# Patient Record
Sex: FEMALE | Race: WHITE | NOT HISPANIC OR LATINO | URBAN - METROPOLITAN AREA
[De-identification: names, ages, dates, MRNs, and addresses within clinical notes are randomized per-mention and may not be internally consistent; named-entity substitution may affect disease eponyms.]

---

## 2018-01-15 ENCOUNTER — EMERGENCY (EMERGENCY)
Facility: HOSPITAL | Age: 48
LOS: 1 days | Discharge: HOME | End: 2018-01-15
Attending: EMERGENCY MEDICINE
Payer: COMMERCIAL

## 2018-01-15 VITALS
HEART RATE: 85 BPM | DIASTOLIC BLOOD PRESSURE: 78 MMHG | OXYGEN SATURATION: 99 % | RESPIRATION RATE: 19 BRPM | TEMPERATURE: 98 F | SYSTOLIC BLOOD PRESSURE: 135 MMHG

## 2018-01-15 VITALS
RESPIRATION RATE: 18 BRPM | SYSTOLIC BLOOD PRESSURE: 149 MMHG | HEART RATE: 88 BPM | DIASTOLIC BLOOD PRESSURE: 88 MMHG | OXYGEN SATURATION: 100 % | TEMPERATURE: 98 F

## 2018-01-15 DIAGNOSIS — N93.8 OTHER SPECIFIED ABNORMAL UTERINE AND VAGINAL BLEEDING: ICD-10-CM

## 2018-01-15 LAB
ALBUMIN SERPL ELPH-MCNC: 4.2 G/DL — SIGNIFICANT CHANGE UP (ref 3.3–5)
ALP SERPL-CCNC: 49 U/L — SIGNIFICANT CHANGE UP (ref 40–120)
ALT FLD-CCNC: 21 U/L RC — SIGNIFICANT CHANGE UP (ref 10–45)
ANION GAP SERPL CALC-SCNC: 13 MMOL/L — SIGNIFICANT CHANGE UP (ref 5–17)
APTT BLD: 28.6 SEC — SIGNIFICANT CHANGE UP (ref 27.5–37.4)
AST SERPL-CCNC: 28 U/L — SIGNIFICANT CHANGE UP (ref 10–40)
BASOPHILS # BLD AUTO: 0 K/UL — SIGNIFICANT CHANGE UP (ref 0–0.2)
BASOPHILS NFR BLD AUTO: 0.3 % — SIGNIFICANT CHANGE UP (ref 0–2)
BILIRUB SERPL-MCNC: 0.2 MG/DL — SIGNIFICANT CHANGE UP (ref 0.2–1.2)
BLD GP AB SCN SERPL QL: NEGATIVE — SIGNIFICANT CHANGE UP
BUN SERPL-MCNC: 11 MG/DL — SIGNIFICANT CHANGE UP (ref 7–23)
CALCIUM SERPL-MCNC: 9.4 MG/DL — SIGNIFICANT CHANGE UP (ref 8.4–10.5)
CHLORIDE SERPL-SCNC: 101 MMOL/L — SIGNIFICANT CHANGE UP (ref 96–108)
CO2 SERPL-SCNC: 26 MMOL/L — SIGNIFICANT CHANGE UP (ref 22–31)
CREAT SERPL-MCNC: 0.76 MG/DL — SIGNIFICANT CHANGE UP (ref 0.5–1.3)
EOSINOPHIL # BLD AUTO: 0.1 K/UL — SIGNIFICANT CHANGE UP (ref 0–0.5)
EOSINOPHIL NFR BLD AUTO: 1.1 % — SIGNIFICANT CHANGE UP (ref 0–6)
GAS PNL BLDV: SIGNIFICANT CHANGE UP
GLUCOSE SERPL-MCNC: 86 MG/DL — SIGNIFICANT CHANGE UP (ref 70–99)
HCT VFR BLD CALC: 37.2 % — SIGNIFICANT CHANGE UP (ref 34.5–45)
HGB BLD-MCNC: 13.3 G/DL — SIGNIFICANT CHANGE UP (ref 11.5–15.5)
INR BLD: 1.04 RATIO — SIGNIFICANT CHANGE UP (ref 0.88–1.16)
LYMPHOCYTES # BLD AUTO: 1.1 K/UL — SIGNIFICANT CHANGE UP (ref 1–3.3)
LYMPHOCYTES # BLD AUTO: 23.6 % — SIGNIFICANT CHANGE UP (ref 13–44)
MCHC RBC-ENTMCNC: 32.7 PG — SIGNIFICANT CHANGE UP (ref 27–34)
MCHC RBC-ENTMCNC: 35.8 GM/DL — SIGNIFICANT CHANGE UP (ref 32–36)
MCV RBC AUTO: 91.3 FL — SIGNIFICANT CHANGE UP (ref 80–100)
MONOCYTES # BLD AUTO: 0.7 K/UL — SIGNIFICANT CHANGE UP (ref 0–0.9)
MONOCYTES NFR BLD AUTO: 15.3 % — HIGH (ref 2–14)
NEUTROPHILS # BLD AUTO: 2.9 K/UL — SIGNIFICANT CHANGE UP (ref 1.8–7.4)
NEUTROPHILS NFR BLD AUTO: 59.7 % — SIGNIFICANT CHANGE UP (ref 43–77)
PLATELET # BLD AUTO: 191 K/UL — SIGNIFICANT CHANGE UP (ref 150–400)
POTASSIUM SERPL-MCNC: 3.8 MMOL/L — SIGNIFICANT CHANGE UP (ref 3.5–5.3)
POTASSIUM SERPL-SCNC: 3.8 MMOL/L — SIGNIFICANT CHANGE UP (ref 3.5–5.3)
PROT SERPL-MCNC: 7.3 G/DL — SIGNIFICANT CHANGE UP (ref 6–8.3)
PROTHROM AB SERPL-ACNC: 11.2 SEC — SIGNIFICANT CHANGE UP (ref 9.8–12.7)
RBC # BLD: 4.08 M/UL — SIGNIFICANT CHANGE UP (ref 3.8–5.2)
RBC # FLD: 11.8 % — SIGNIFICANT CHANGE UP (ref 10.3–14.5)
RH IG SCN BLD-IMP: POSITIVE — SIGNIFICANT CHANGE UP
SODIUM SERPL-SCNC: 140 MMOL/L — SIGNIFICANT CHANGE UP (ref 135–145)
WBC # BLD: 4.8 K/UL — SIGNIFICANT CHANGE UP (ref 3.8–10.5)
WBC # FLD AUTO: 4.8 K/UL — SIGNIFICANT CHANGE UP (ref 3.8–10.5)

## 2018-01-15 PROCEDURE — 82803 BLOOD GASES ANY COMBINATION: CPT

## 2018-01-15 PROCEDURE — 86900 BLOOD TYPING SEROLOGIC ABO: CPT

## 2018-01-15 PROCEDURE — 85610 PROTHROMBIN TIME: CPT

## 2018-01-15 PROCEDURE — 85027 COMPLETE CBC AUTOMATED: CPT

## 2018-01-15 PROCEDURE — 84295 ASSAY OF SERUM SODIUM: CPT

## 2018-01-15 PROCEDURE — 85730 THROMBOPLASTIN TIME PARTIAL: CPT

## 2018-01-15 PROCEDURE — 86901 BLOOD TYPING SEROLOGIC RH(D): CPT

## 2018-01-15 PROCEDURE — 86850 RBC ANTIBODY SCREEN: CPT

## 2018-01-15 PROCEDURE — 85014 HEMATOCRIT: CPT

## 2018-01-15 PROCEDURE — 93005 ELECTROCARDIOGRAM TRACING: CPT

## 2018-01-15 PROCEDURE — 82947 ASSAY GLUCOSE BLOOD QUANT: CPT

## 2018-01-15 PROCEDURE — 99284 EMERGENCY DEPT VISIT MOD MDM: CPT | Mod: 25

## 2018-01-15 PROCEDURE — 82330 ASSAY OF CALCIUM: CPT

## 2018-01-15 PROCEDURE — 93010 ELECTROCARDIOGRAM REPORT: CPT

## 2018-01-15 PROCEDURE — 80053 COMPREHEN METABOLIC PANEL: CPT

## 2018-01-15 PROCEDURE — 83605 ASSAY OF LACTIC ACID: CPT

## 2018-01-15 PROCEDURE — 84132 ASSAY OF SERUM POTASSIUM: CPT

## 2018-01-15 PROCEDURE — 82435 ASSAY OF BLOOD CHLORIDE: CPT

## 2018-01-15 RX ORDER — SODIUM CHLORIDE 9 MG/ML
500 INJECTION INTRAMUSCULAR; INTRAVENOUS; SUBCUTANEOUS ONCE
Qty: 0 | Refills: 0 | Status: COMPLETED | OUTPATIENT
Start: 2018-01-15 | End: 2018-01-15

## 2018-01-15 RX ORDER — MEDROXYPROGESTERONE ACETATE 150 MG/ML
1 INJECTION, SUSPENSION, EXTENDED RELEASE INTRAMUSCULAR
Qty: 10 | Refills: 0 | OUTPATIENT
Start: 2018-01-15 | End: 2018-01-24

## 2018-01-15 RX ADMIN — SODIUM CHLORIDE 500 MILLILITER(S): 9 INJECTION INTRAMUSCULAR; INTRAVENOUS; SUBCUTANEOUS at 16:06

## 2018-01-15 NOTE — ED PROVIDER NOTE - MEDICAL DECISION MAKING DETAILS
Patient presents with weakness and dizziness in setting of heavy menstrual bleeding. Will check CBC. Gynecology consulted.

## 2018-01-15 NOTE — ED PROVIDER NOTE - PHYSICAL EXAMINATION
General: NAD, laying in bed feeling weak  HEENT: PERRL; normal oropharynx  Neck: no JVD  Respiratory: CTAB  CV: RRR, no murmurs  GI: abdomen soft, non-tender, non-distended  : pelvic tenderness to palpation L > R  Neurology: AAOx3, no focal deficits  Psych: normal mood/affect  Extremities: No edema, + peripheral pulses  Skin: warm and dry

## 2018-01-15 NOTE — ED PROVIDER NOTE - NS ED ROS FT
REVIEW OF SYSTEMS:    CONSTITUTIONAL: +weakness; No fevers or chills  EYES/ENT: No visual changes;  No vertigo or throat pain   NECK: No pain or stiffness  RESPIRATORY: No cough, wheezing, hemoptysis; No shortness of breath  CARDIOVASCULAR: No chest pain or palpitations  GASTROINTESTINAL: No abdominal pain. No nausea, vomiting, or hematemesis; No diarrhea or constipation. No melena or hematochezia.  GENITOURINARY: No dysuria, frequency or hematuria  GYNECOLOGY: +heavy bleeding; +pelvic pain  NEUROLOGICAL: No numbness or weakness  MUSCULOSKELETAL: No joint pain. Normal range of motion  SKIN: No itching, burning, rashes, or lesions   All other review of systems is negative unless indicated above.

## 2018-01-15 NOTE — CONSULT NOTE ADULT - PROBLEM SELECTOR RECOMMENDATION 9
- provera 10mg for 10 days  - patient given bleeding precautions, told to return to the ED if bleeding is heavier or patient has syncope/ worsening dizziness  - patient to f/u with Dr. Ferguson in the office outpatient    d/w Dr. Sergey Brown PGY-2

## 2018-01-15 NOTE — ED PROVIDER NOTE - ATTENDING CONTRIBUTION TO CARE
attending Eber: 47yF p/w heavy vaginal bleeding x 2 weeks. Over last several days with passage of clots, soaking through 1 pad per hour. Seen at urgent care today where she had a syncopal episode. Spoke with OBGYN Florentino Ferguson and sent into ER. Mild lower abdominal cramping. Denies bloody/black stools, h/o easy bleeding or bruising. On exam VSS, pink conjunctiva, MMM, abdomen soft/NT. Will obtain labs, ekg, IVF, OB consult and reassess.

## 2018-01-15 NOTE — ED ADULT NURSE NOTE - OBJECTIVE STATEMENT
47 year old female A&OX3 PMHX of Partial Hysterectomy presents with bright red vaginal bleeding x 2 weeks. Patient states that for the last 7 days vaginal bleeding has increased to 1 pad per hour. Patient endorses going to an urgent care facility and experiencing a syncopal episode. Patient's lung sounds are clear and equal bilaterally. Patient is actively bleeding at this time. Patient does appear pale on arrival. Patient also endorses dizziness, weakness, shortness of breath. Patient notes that the other night she experienced and episode of chest pain. Patient denies nausea, vomiting, fevers, chills, diarrhea, constipation.

## 2018-01-15 NOTE — ED PROVIDER NOTE - OBJECTIVE STATEMENT
47-year-old woman with history of abnormal uterine bleeding who presents to the ED with 2 weeks of heavy menstruation. Over the past week, she has needed to change her pads about once per hour. She had a syncopal episode at urgent care. Today, she passed a very large clot. She is perimenopausal and has had sporadic menstrual cycles over the past year. Not on any medications.  Gyn: Nettie Ferguson

## 2018-02-28 ENCOUNTER — OUTPATIENT (OUTPATIENT)
Dept: OUTPATIENT SERVICES | Facility: HOSPITAL | Age: 48
LOS: 1 days | End: 2018-02-28
Payer: COMMERCIAL

## 2018-02-28 VITALS
DIASTOLIC BLOOD PRESSURE: 83 MMHG | TEMPERATURE: 98 F | RESPIRATION RATE: 20 BRPM | SYSTOLIC BLOOD PRESSURE: 122 MMHG | HEIGHT: 62 IN | OXYGEN SATURATION: 98 % | WEIGHT: 134.04 LBS | HEART RATE: 72 BPM

## 2018-02-28 DIAGNOSIS — Z98.890 OTHER SPECIFIED POSTPROCEDURAL STATES: Chronic | ICD-10-CM

## 2018-02-28 DIAGNOSIS — Z90.89 ACQUIRED ABSENCE OF OTHER ORGANS: Chronic | ICD-10-CM

## 2018-02-28 DIAGNOSIS — Z01.818 ENCOUNTER FOR OTHER PREPROCEDURAL EXAMINATION: ICD-10-CM

## 2018-02-28 DIAGNOSIS — N84.0 POLYP OF CORPUS UTERI: ICD-10-CM

## 2018-02-28 LAB
HCT VFR BLD CALC: 36 % — SIGNIFICANT CHANGE UP (ref 34.5–45)
HGB BLD-MCNC: 12 G/DL — SIGNIFICANT CHANGE UP (ref 11.5–15.5)
MCHC RBC-ENTMCNC: 30.5 PG — SIGNIFICANT CHANGE UP (ref 27–34)
MCHC RBC-ENTMCNC: 33.3 GM/DL — SIGNIFICANT CHANGE UP (ref 32–36)
MCV RBC AUTO: 91.4 FL — SIGNIFICANT CHANGE UP (ref 80–100)
PLATELET # BLD AUTO: 248 K/UL — SIGNIFICANT CHANGE UP (ref 150–400)
RBC # BLD: 3.94 M/UL — SIGNIFICANT CHANGE UP (ref 3.8–5.2)
RBC # FLD: 12.9 % — SIGNIFICANT CHANGE UP (ref 10.3–14.5)
WBC # BLD: 5.46 K/UL — SIGNIFICANT CHANGE UP (ref 3.8–10.5)
WBC # FLD AUTO: 5.46 K/UL — SIGNIFICANT CHANGE UP (ref 3.8–10.5)

## 2018-02-28 PROCEDURE — G0463: CPT

## 2018-02-28 PROCEDURE — 85027 COMPLETE CBC AUTOMATED: CPT

## 2018-02-28 RX ORDER — LIDOCAINE HCL 20 MG/ML
0.2 VIAL (ML) INJECTION ONCE
Qty: 0 | Refills: 0 | Status: DISCONTINUED | OUTPATIENT
Start: 2018-03-07 | End: 2018-03-22

## 2018-02-28 RX ORDER — SODIUM CHLORIDE 9 MG/ML
3 INJECTION INTRAMUSCULAR; INTRAVENOUS; SUBCUTANEOUS EVERY 8 HOURS
Qty: 0 | Refills: 0 | Status: DISCONTINUED | OUTPATIENT
Start: 2018-03-07 | End: 2018-03-22

## 2018-02-28 RX ORDER — ACETAMINOPHEN 500 MG
1000 TABLET ORAL ONCE
Qty: 0 | Refills: 0 | Status: COMPLETED | OUTPATIENT
Start: 2018-03-07 | End: 2018-03-07

## 2018-02-28 NOTE — H&P PST ADULT - ATTENDING COMMENTS
47 p3 hx rso with hx menorraghia, sono showed uterus 9x5x5.9 with ? adenomyosis , ebx with polyp 1.14x.4  here for operative hysteroscopy with symphion and novasure ablation if possible. Risks explained including uterine rupture, bleeding  and infection.

## 2018-02-28 NOTE — H&P PST ADULT - NSANTHOSAYNRD_GEN_A_CORE
No. EVERETTE screening performed.  STOP BANG Legend: 0-2 = LOW Risk; 3-4 = INTERMEDIATE Risk; 5-8 = HIGH Risk

## 2018-02-28 NOTE — H&P PST ADULT - RS GEN PE MLT RESP DETAILS PC
respirations non-labored/good air movement/normal/airway patent/clear to auscultation bilaterally/breath sounds equal

## 2018-02-28 NOTE — H&P PST ADULT - HISTORY OF PRESENT ILLNESS
47 yr old female with uterine polyp, irregular periods , Menorrhagia , Dysmenorrhea for past 1 year 47 yr old female with uterine polyp, irregular periods , Menorrhagia , Dysmenorrhea for past 1 year- uterine polyp. Now coming in for D & C, Operative Hysteroscopy, NovaSure Ablation, Symphion on 3/7/18.

## 2018-03-07 ENCOUNTER — TRANSCRIPTION ENCOUNTER (OUTPATIENT)
Age: 48
End: 2018-03-07

## 2018-03-07 ENCOUNTER — RESULT REVIEW (OUTPATIENT)
Age: 48
End: 2018-03-07

## 2018-03-07 ENCOUNTER — OUTPATIENT (OUTPATIENT)
Dept: OUTPATIENT SERVICES | Facility: HOSPITAL | Age: 48
LOS: 1 days | End: 2018-03-07
Payer: COMMERCIAL

## 2018-03-07 VITALS
DIASTOLIC BLOOD PRESSURE: 65 MMHG | SYSTOLIC BLOOD PRESSURE: 111 MMHG | OXYGEN SATURATION: 98 % | RESPIRATION RATE: 15 BRPM | HEART RATE: 68 BPM

## 2018-03-07 VITALS
HEIGHT: 62 IN | TEMPERATURE: 98 F | DIASTOLIC BLOOD PRESSURE: 84 MMHG | RESPIRATION RATE: 16 BRPM | WEIGHT: 134.04 LBS | HEART RATE: 77 BPM | SYSTOLIC BLOOD PRESSURE: 129 MMHG | OXYGEN SATURATION: 100 %

## 2018-03-07 DIAGNOSIS — Z01.818 ENCOUNTER FOR OTHER PREPROCEDURAL EXAMINATION: ICD-10-CM

## 2018-03-07 DIAGNOSIS — N84.0 POLYP OF CORPUS UTERI: ICD-10-CM

## 2018-03-07 DIAGNOSIS — Z98.890 OTHER SPECIFIED POSTPROCEDURAL STATES: Chronic | ICD-10-CM

## 2018-03-07 DIAGNOSIS — Z90.89 ACQUIRED ABSENCE OF OTHER ORGANS: Chronic | ICD-10-CM

## 2018-03-07 PROCEDURE — 88305 TISSUE EXAM BY PATHOLOGIST: CPT | Mod: 26

## 2018-03-07 PROCEDURE — 58563 HYSTEROSCOPY ABLATION: CPT

## 2018-03-07 PROCEDURE — 88305 TISSUE EXAM BY PATHOLOGIST: CPT

## 2018-03-07 RX ORDER — FAMOTIDINE 10 MG/ML
1 INJECTION INTRAVENOUS
Qty: 0 | Refills: 0 | COMMUNITY

## 2018-03-07 RX ORDER — CELECOXIB 200 MG/1
200 CAPSULE ORAL ONCE
Qty: 0 | Refills: 0 | Status: COMPLETED | OUTPATIENT
Start: 2018-03-07 | End: 2018-03-07

## 2018-03-07 RX ORDER — OXYCODONE HYDROCHLORIDE 5 MG/1
10 TABLET ORAL ONCE
Qty: 0 | Refills: 0 | Status: DISCONTINUED | OUTPATIENT
Start: 2018-03-07 | End: 2018-03-07

## 2018-03-07 RX ORDER — SODIUM CHLORIDE 9 MG/ML
1000 INJECTION, SOLUTION INTRAVENOUS
Qty: 0 | Refills: 0 | Status: DISCONTINUED | OUTPATIENT
Start: 2018-03-07 | End: 2018-03-22

## 2018-03-07 RX ORDER — ONDANSETRON 8 MG/1
4 TABLET, FILM COATED ORAL ONCE
Qty: 0 | Refills: 0 | Status: DISCONTINUED | OUTPATIENT
Start: 2018-03-07 | End: 2018-03-22

## 2018-03-07 RX ORDER — CELECOXIB 200 MG/1
200 CAPSULE ORAL ONCE
Qty: 0 | Refills: 0 | Status: DISCONTINUED | OUTPATIENT
Start: 2018-03-07 | End: 2018-03-22

## 2018-03-07 RX ORDER — OXYCODONE HYDROCHLORIDE 5 MG/1
5 TABLET ORAL ONCE
Qty: 0 | Refills: 0 | Status: DISCONTINUED | OUTPATIENT
Start: 2018-03-07 | End: 2018-03-07

## 2018-03-07 RX ADMIN — Medication 1000 MILLIGRAM(S): at 09:03

## 2018-03-07 RX ADMIN — CELECOXIB 200 MILLIGRAM(S): 200 CAPSULE ORAL at 09:03

## 2018-03-07 NOTE — BRIEF OPERATIVE NOTE - PROCEDURE
<<-----Click on this checkbox to enter Procedure Dilation and curettage  03/07/2018    Active  GILMA  NovaSure endometrial ablation  03/07/2018    Active  GILMA  Hysteroscopy  03/07/2018    Active  GILMA

## 2018-03-09 LAB — SURGICAL PATHOLOGY STUDY: SIGNIFICANT CHANGE UP

## 2018-10-05 PROBLEM — N83.201 UNSPECIFIED OVARIAN CYST, RIGHT SIDE: Chronic | Status: ACTIVE | Noted: 2018-02-28

## 2018-10-05 PROBLEM — J30.2 OTHER SEASONAL ALLERGIC RHINITIS: Chronic | Status: ACTIVE | Noted: 2018-02-28

## 2018-10-26 ENCOUNTER — APPOINTMENT (OUTPATIENT)
Dept: OBGYN | Facility: CLINIC | Age: 48
End: 2018-10-26
Payer: COMMERCIAL

## 2018-10-26 PROCEDURE — 99243 OFF/OP CNSLTJ NEW/EST LOW 30: CPT

## 2019-03-07 ENCOUNTER — OUTPATIENT (OUTPATIENT)
Dept: OUTPATIENT SERVICES | Facility: HOSPITAL | Age: 49
LOS: 1 days | End: 2019-03-07
Payer: COMMERCIAL

## 2019-03-07 VITALS
DIASTOLIC BLOOD PRESSURE: 82 MMHG | RESPIRATION RATE: 16 BRPM | WEIGHT: 132.06 LBS | TEMPERATURE: 98 F | OXYGEN SATURATION: 97 % | SYSTOLIC BLOOD PRESSURE: 127 MMHG | HEIGHT: 62 IN | HEART RATE: 62 BPM

## 2019-03-07 DIAGNOSIS — Z98.890 OTHER SPECIFIED POSTPROCEDURAL STATES: Chronic | ICD-10-CM

## 2019-03-07 DIAGNOSIS — Z90.89 ACQUIRED ABSENCE OF OTHER ORGANS: Chronic | ICD-10-CM

## 2019-03-07 DIAGNOSIS — Z01.84 ENCOUNTER FOR ANTIBODY RESPONSE EXAMINATION: ICD-10-CM

## 2019-03-07 DIAGNOSIS — Z90.49 ACQUIRED ABSENCE OF OTHER SPECIFIED PARTS OF DIGESTIVE TRACT: Chronic | ICD-10-CM

## 2019-03-07 DIAGNOSIS — R10.2 PELVIC AND PERINEAL PAIN: ICD-10-CM

## 2019-03-07 DIAGNOSIS — Z29.9 ENCOUNTER FOR PROPHYLACTIC MEASURES, UNSPECIFIED: ICD-10-CM

## 2019-03-07 DIAGNOSIS — N80.0 ENDOMETRIOSIS OF UTERUS: ICD-10-CM

## 2019-03-07 LAB
BLD GP AB SCN SERPL QL: NEGATIVE — SIGNIFICANT CHANGE UP
RH IG SCN BLD-IMP: POSITIVE — SIGNIFICANT CHANGE UP

## 2019-03-07 PROCEDURE — G0463: CPT

## 2019-03-07 PROCEDURE — 86900 BLOOD TYPING SEROLOGIC ABO: CPT

## 2019-03-07 PROCEDURE — 87086 URINE CULTURE/COLONY COUNT: CPT

## 2019-03-07 PROCEDURE — 86850 RBC ANTIBODY SCREEN: CPT

## 2019-03-07 PROCEDURE — 86901 BLOOD TYPING SEROLOGIC RH(D): CPT

## 2019-03-07 RX ORDER — LIDOCAINE HCL 20 MG/ML
0.2 VIAL (ML) INJECTION ONCE
Qty: 0 | Refills: 0 | Status: DISCONTINUED | OUTPATIENT
Start: 2019-03-14 | End: 2019-03-16

## 2019-03-07 NOTE — H&P PST ADULT - PMH
Adenomyosis  of uterus  Cyst of right ovary  removed- benign  Seasonal allergies    Uterine polyp  history of

## 2019-03-07 NOTE — H&P PST ADULT - HISTORY OF PRESENT ILLNESS
48  yr old female with adenomyosis of uterus, abnormal vaginal bleeding, spotting, presents to PST for scheduled total abdominal hysterectomy, bilateral salpingectomy, possible cystoscopy, possible exploratory laparotomy, abdominoplasty on 3/14/19. Denies fever, chills, no acute complaints.

## 2019-03-07 NOTE — H&P PST ADULT - PSH
H/O unilateral oophorectomy  right side with fallopian tube removal, age 16  History of appendectomy    History of hysteroscopy  D & C, Operative Hysteroscopy, NovaSure Ablation, Symphion on 3/7/18.  S/P tonsillectomy

## 2019-03-07 NOTE — H&P PST ADULT - ATTENDING COMMENTS
GYN Attg:  Pt consented for KEELY/BS in conjunction with plastics for abdominoplasty. All questions answered. Pt verbalized full understanding.  CANDIDA Mcdaniel

## 2019-03-07 NOTE — H&P PST ADULT - ASSESSMENT
GREGORIOI VTE 2.0 SCORE [CLOT updated 2019]    AGE RELATED RISK FACTORS                                                       MOBILITY RELATED FACTORS  [ x] Age 41-60 years                                            (1 Point)                    [ ] Bed rest                                                        (1 Point)  [ ] Age: 61-74 years                                           (2 Points)                  [ ] Plaster cast                                                   (2 Points)  [ ] Age= 75 years                                              (3 Points)                    [ ] Bed bound for more than 72 hours                 (2 Points)    DISEASE RELATED RISK FACTORS                                               GENDER SPECIFIC FACTORS  [ ] Edema in the lower extremities                       (1 Point)              [ ] Pregnancy                                                     (1 Point)  [ ] Varicose veins                                               (1 Point)                     [ ] Post-partum < 6 weeks                                   (1 Point)             [ ] BMI > 25 Kg/m2                                            (1 Point)                     [ ] Hormonal therapy  or oral contraception          (1 Point)                 [ ] Sepsis (in the previous month)                        (1 Point)               [ ] History of pregnancy complications                 (1 point)  [ ] Pneumonia or serious lung disease                                               [ ] Unexplained or recurrent                     (1 Point)           (in the previous month)                               (1 Point)  [ ] Abnormal pulmonary function test                     (1 Point)                 SURGERY RELATED RISK FACTORS  [ ] Acute myocardial infarction                              (1 Point)               [ ]  Section                                             (1 Point)  [ ] Congestive heart failure (in the previous month)  (1 Point)      [ ] Minor surgery                                                  (1 Point)   [ ] Inflammatory bowel disease                             (1 Point)               [ ] Arthroscopic surgery                                        (2 Points)  [ ] Central venous access                                      (2 Points)                [ x] General surgery lasting more than 45 minutes (2 points)  [ ] Malignancy- Present or previous                   (2 Points)                [ ] Elective arthroplasty                                         (5 points)    [ ] Stroke (in the previous month)                          (5 Points)                                                                                                                                                           HEMATOLOGY RELATED FACTORS                                                 TRAUMA RELATED RISK FACTORS  [ ] Prior episodes of VTE                                     (3 Points)                [ ] Fracture of the hip, pelvis, or leg                       (5 Points)  [ ] Positive family history for VTE                         (3 Points)             [ ] Acute spinal cord injury (in the previous month)  (5 Points)  [ ] Prothrombin 65559 A                                     (3 Points)               [ ] Paralysis  (less than 1 month)                             (5 Points)  [ ] Factor V Leiden                                             (3 Points)                  [ ] Multiple Trauma within 1 month                        (5 Points)  [ ] Lupus anticoagulants                                     (3 Points)                                                           [ ] Anticardiolipin antibodies                               (3 Points)                                                       [ ] High homocysteine in the blood                      (3 Points)                                             [ ] Other congenital or acquired thrombophilia      (3 Points)                                                [ ] Heparin induced thrombocytopenia                  (3 Points)                                     Total Score [   3       ]

## 2019-03-07 NOTE — H&P PST ADULT - PROBLEM SELECTOR PLAN 1
total abdominal hysterectomy, bilateral salpingectomy, possible cystoscopy, possible exploratory laparotomy, abdominoplasty on 3/14/19.  PST instructions provided, surgical scrub given, patient  verbalized understanding.   CBC, BMP, HgA1C done by PCP on 3/4/19, will be faxed to [facility]. Urine cx and T&S collected and sent.

## 2019-03-08 PROBLEM — N84.0 POLYP OF CORPUS UTERI: Chronic | Status: ACTIVE | Noted: 2018-02-28

## 2019-03-08 LAB
CULTURE RESULTS: SIGNIFICANT CHANGE UP
SPECIMEN SOURCE: SIGNIFICANT CHANGE UP

## 2019-03-13 ENCOUNTER — TRANSCRIPTION ENCOUNTER (OUTPATIENT)
Age: 49
End: 2019-03-13

## 2019-03-13 VITALS
DIASTOLIC BLOOD PRESSURE: 82 MMHG | OXYGEN SATURATION: 97 % | SYSTOLIC BLOOD PRESSURE: 127 MMHG | HEART RATE: 62 BPM | RESPIRATION RATE: 16 BRPM | TEMPERATURE: 80 F | WEIGHT: 132.06 LBS | HEIGHT: 62.01 IN

## 2019-03-14 ENCOUNTER — INPATIENT (INPATIENT)
Facility: HOSPITAL | Age: 49
LOS: 1 days | Discharge: HOME | DRG: 743 | End: 2019-03-16
Attending: OBSTETRICS & GYNECOLOGY | Admitting: OBSTETRICS & GYNECOLOGY
Payer: COMMERCIAL

## 2019-03-14 ENCOUNTER — RESULT REVIEW (OUTPATIENT)
Age: 49
End: 2019-03-14

## 2019-03-14 ENCOUNTER — APPOINTMENT (OUTPATIENT)
Dept: OBGYN | Facility: HOSPITAL | Age: 49
End: 2019-03-14

## 2019-03-14 DIAGNOSIS — Z90.49 ACQUIRED ABSENCE OF OTHER SPECIFIED PARTS OF DIGESTIVE TRACT: Chronic | ICD-10-CM

## 2019-03-14 DIAGNOSIS — Z90.89 ACQUIRED ABSENCE OF OTHER ORGANS: Chronic | ICD-10-CM

## 2019-03-14 DIAGNOSIS — Z98.890 OTHER SPECIFIED POSTPROCEDURAL STATES: Chronic | ICD-10-CM

## 2019-03-14 DIAGNOSIS — R10.2 PELVIC AND PERINEAL PAIN: ICD-10-CM

## 2019-03-14 LAB
ANION GAP SERPL CALC-SCNC: 16 MMOL/L — SIGNIFICANT CHANGE UP (ref 5–17)
BASOPHILS # BLD AUTO: 0 K/UL — SIGNIFICANT CHANGE UP (ref 0–0.2)
BASOPHILS NFR BLD AUTO: 0.1 % — SIGNIFICANT CHANGE UP (ref 0–2)
BUN SERPL-MCNC: 12 MG/DL — SIGNIFICANT CHANGE UP (ref 7–23)
CALCIUM SERPL-MCNC: 7.9 MG/DL — LOW (ref 8.4–10.5)
CHLORIDE SERPL-SCNC: 104 MMOL/L — SIGNIFICANT CHANGE UP (ref 96–108)
CO2 SERPL-SCNC: 21 MMOL/L — LOW (ref 22–31)
CREAT SERPL-MCNC: 0.81 MG/DL — SIGNIFICANT CHANGE UP (ref 0.5–1.3)
EOSINOPHIL # BLD AUTO: 0 K/UL — SIGNIFICANT CHANGE UP (ref 0–0.5)
EOSINOPHIL NFR BLD AUTO: 0.2 % — SIGNIFICANT CHANGE UP (ref 0–6)
GLUCOSE BLDC GLUCOMTR-MCNC: 89 MG/DL — SIGNIFICANT CHANGE UP (ref 70–99)
GLUCOSE SERPL-MCNC: 218 MG/DL — HIGH (ref 70–99)
HCG UR QL: NEGATIVE — SIGNIFICANT CHANGE UP
HCT VFR BLD CALC: 37.5 % — SIGNIFICANT CHANGE UP (ref 34.5–45)
HGB BLD-MCNC: 13 G/DL — SIGNIFICANT CHANGE UP (ref 11.5–15.5)
LYMPHOCYTES # BLD AUTO: 0.7 K/UL — LOW (ref 1–3.3)
LYMPHOCYTES # BLD AUTO: 4.9 % — LOW (ref 13–44)
MCHC RBC-ENTMCNC: 31.3 PG — SIGNIFICANT CHANGE UP (ref 27–34)
MCHC RBC-ENTMCNC: 34.6 GM/DL — SIGNIFICANT CHANGE UP (ref 32–36)
MCV RBC AUTO: 90.6 FL — SIGNIFICANT CHANGE UP (ref 80–100)
MONOCYTES # BLD AUTO: 0.8 K/UL — SIGNIFICANT CHANGE UP (ref 0–0.9)
MONOCYTES NFR BLD AUTO: 5.5 % — SIGNIFICANT CHANGE UP (ref 2–14)
NEUTROPHILS # BLD AUTO: 13.3 K/UL — HIGH (ref 1.8–7.4)
NEUTROPHILS NFR BLD AUTO: 89.3 % — HIGH (ref 43–77)
PLATELET # BLD AUTO: 198 K/UL — SIGNIFICANT CHANGE UP (ref 150–400)
POTASSIUM SERPL-MCNC: 4.2 MMOL/L — SIGNIFICANT CHANGE UP (ref 3.5–5.3)
POTASSIUM SERPL-SCNC: 4.2 MMOL/L — SIGNIFICANT CHANGE UP (ref 3.5–5.3)
RBC # BLD: 4.14 M/UL — SIGNIFICANT CHANGE UP (ref 3.8–5.2)
RBC # FLD: 12.1 % — SIGNIFICANT CHANGE UP (ref 10.3–14.5)
SODIUM SERPL-SCNC: 141 MMOL/L — SIGNIFICANT CHANGE UP (ref 135–145)
WBC # BLD: 14.9 K/UL — HIGH (ref 3.8–10.5)
WBC # FLD AUTO: 14.9 K/UL — HIGH (ref 3.8–10.5)

## 2019-03-14 PROCEDURE — 88302 TISSUE EXAM BY PATHOLOGIST: CPT | Mod: 26

## 2019-03-14 PROCEDURE — 88307 TISSUE EXAM BY PATHOLOGIST: CPT | Mod: 26

## 2019-03-14 PROCEDURE — 74018 RADEX ABDOMEN 1 VIEW: CPT | Mod: 26

## 2019-03-14 PROCEDURE — 15830 EXC EXCESSIVE SKIN ABDOMEN: CPT

## 2019-03-14 PROCEDURE — 58150 TOTAL HYSTERECTOMY: CPT | Mod: 80

## 2019-03-14 PROCEDURE — 58150 TOTAL HYSTERECTOMY: CPT

## 2019-03-14 PROCEDURE — 44604 SUTURE LARGE INTESTINE: CPT

## 2019-03-14 RX ORDER — DOCUSATE SODIUM 100 MG
100 CAPSULE ORAL THREE TIMES A DAY
Qty: 0 | Refills: 0 | Status: DISCONTINUED | OUTPATIENT
Start: 2019-03-14 | End: 2019-03-16

## 2019-03-14 RX ORDER — SENNA PLUS 8.6 MG/1
2 TABLET ORAL AT BEDTIME
Qty: 0 | Refills: 0 | Status: DISCONTINUED | OUTPATIENT
Start: 2019-03-14 | End: 2019-03-16

## 2019-03-14 RX ORDER — ENOXAPARIN SODIUM 100 MG/ML
40 INJECTION SUBCUTANEOUS EVERY 24 HOURS
Qty: 0 | Refills: 0 | Status: DISCONTINUED | OUTPATIENT
Start: 2019-03-14 | End: 2019-03-16

## 2019-03-14 RX ORDER — CEFOTETAN DISODIUM 1 G
2 VIAL (EA) INJECTION ONCE
Qty: 0 | Refills: 0 | Status: DISCONTINUED | OUTPATIENT
Start: 2019-03-14 | End: 2019-03-14

## 2019-03-14 RX ORDER — HYDROMORPHONE HYDROCHLORIDE 2 MG/ML
0.5 INJECTION INTRAMUSCULAR; INTRAVENOUS; SUBCUTANEOUS
Qty: 0 | Refills: 0 | Status: DISCONTINUED | OUTPATIENT
Start: 2019-03-14 | End: 2019-03-15

## 2019-03-14 RX ORDER — HYDROMORPHONE HYDROCHLORIDE 2 MG/ML
30 INJECTION INTRAMUSCULAR; INTRAVENOUS; SUBCUTANEOUS
Qty: 0 | Refills: 0 | Status: DISCONTINUED | OUTPATIENT
Start: 2019-03-14 | End: 2019-03-15

## 2019-03-14 RX ORDER — SODIUM CHLORIDE 9 MG/ML
1000 INJECTION, SOLUTION INTRAVENOUS
Qty: 0 | Refills: 0 | Status: DISCONTINUED | OUTPATIENT
Start: 2019-03-14 | End: 2019-03-16

## 2019-03-14 RX ORDER — ACETAMINOPHEN 500 MG
975 TABLET ORAL ONCE
Qty: 0 | Refills: 0 | Status: COMPLETED | OUTPATIENT
Start: 2019-03-14 | End: 2019-03-14

## 2019-03-14 RX ORDER — NALOXONE HYDROCHLORIDE 4 MG/.1ML
0.1 SPRAY NASAL
Qty: 0 | Refills: 0 | Status: DISCONTINUED | OUTPATIENT
Start: 2019-03-14 | End: 2019-03-15

## 2019-03-14 RX ORDER — ACETAMINOPHEN 500 MG
1000 TABLET ORAL ONCE
Qty: 0 | Refills: 0 | Status: COMPLETED | OUTPATIENT
Start: 2019-03-14 | End: 2019-03-15

## 2019-03-14 RX ORDER — ONDANSETRON 8 MG/1
4 TABLET, FILM COATED ORAL ONCE
Qty: 0 | Refills: 0 | Status: COMPLETED | OUTPATIENT
Start: 2019-03-14 | End: 2019-03-14

## 2019-03-14 RX ORDER — CEFAZOLIN SODIUM 1 G
2000 VIAL (EA) INJECTION EVERY 8 HOURS
Qty: 0 | Refills: 0 | Status: DISCONTINUED | OUTPATIENT
Start: 2019-03-14 | End: 2019-03-15

## 2019-03-14 RX ORDER — SODIUM CHLORIDE 9 MG/ML
3 INJECTION INTRAMUSCULAR; INTRAVENOUS; SUBCUTANEOUS EVERY 8 HOURS
Qty: 0 | Refills: 0 | Status: DISCONTINUED | OUTPATIENT
Start: 2019-03-14 | End: 2019-03-14

## 2019-03-14 RX ORDER — ONDANSETRON 8 MG/1
4 TABLET, FILM COATED ORAL EVERY 6 HOURS
Qty: 0 | Refills: 0 | Status: DISCONTINUED | OUTPATIENT
Start: 2019-03-14 | End: 2019-03-15

## 2019-03-14 RX ORDER — DIAZEPAM 5 MG
5 TABLET ORAL EVERY 6 HOURS
Qty: 0 | Refills: 0 | Status: DISCONTINUED | OUTPATIENT
Start: 2019-03-14 | End: 2019-03-16

## 2019-03-14 RX ORDER — FAMOTIDINE 10 MG/ML
20 INJECTION INTRAVENOUS ONCE
Qty: 0 | Refills: 0 | Status: COMPLETED | OUTPATIENT
Start: 2019-03-14 | End: 2019-03-14

## 2019-03-14 RX ORDER — CELECOXIB 200 MG/1
200 CAPSULE ORAL ONCE
Qty: 0 | Refills: 0 | Status: COMPLETED | OUTPATIENT
Start: 2019-03-14 | End: 2019-03-14

## 2019-03-14 RX ADMIN — HYDROMORPHONE HYDROCHLORIDE 30 MILLILITER(S): 2 INJECTION INTRAMUSCULAR; INTRAVENOUS; SUBCUTANEOUS at 20:08

## 2019-03-14 RX ADMIN — ONDANSETRON 4 MILLIGRAM(S): 8 TABLET, FILM COATED ORAL at 20:32

## 2019-03-14 RX ADMIN — FAMOTIDINE 20 MILLIGRAM(S): 10 INJECTION INTRAVENOUS at 11:59

## 2019-03-14 RX ADMIN — SODIUM CHLORIDE 125 MILLILITER(S): 9 INJECTION, SOLUTION INTRAVENOUS at 19:53

## 2019-03-14 RX ADMIN — CELECOXIB 200 MILLIGRAM(S): 200 CAPSULE ORAL at 11:59

## 2019-03-14 RX ADMIN — Medication 100 MILLIGRAM(S): at 22:16

## 2019-03-14 RX ADMIN — ONDANSETRON 4 MILLIGRAM(S): 8 TABLET, FILM COATED ORAL at 23:30

## 2019-03-14 NOTE — BRIEF OPERATIVE NOTE - OPERATION/FINDINGS
approx 10 cm uterus, L adenxa wnl, R ovary surgically absent, cecum adherant to Right cornua taken down sharply, Dr Maxx Bui of general surgery called in to examine cecum oversewed area of dissection, bilateral ureters seen below level of dissection, evicel x 2 placed over cuff and Left pelvic side wall

## 2019-03-14 NOTE — PATIENT PROFILE ADULT - NSPROPTRIGHTNOTIFY_GEN_A_NUR
Patient presents with: Foot Pain  Physical      HPI:  54yr old female with hx of anemia presents for routine adult physical, c/o bilateral heel pain and requesting OCPs for her upcoming travel.    Bilateral heel pain that has been worsening over the past f Family History   Problem Relation Age of Onset   • Heart Surgery Father    • High Blood Pressure Father    • Heart Disorder Father    • Diabetes Father    • Other (Other[other]) Mother         Jaundice   • Diabetes Mother    • Diabetes Brother      Soc Normal mood and affect. Health Maintenance:    1. Colon cancer screening: n/a  2. Last mammogram: UTD per pt   3. Last Pap smear: UTD per pt  4. Dexa Scan: n/a  5.  Immunizations:   Immunization History  Administered            Date(s) Administered    I dosing and SEs  - heel pain 2/2 plantar fasciitis and keesha's deformity, discussed proper footwear, heel inserts, ice, nsaids prn, stretches/exercises and avoid going barefoot  - rx for naproxen with food prn pain provided, pt has tolerated it in the pas declines

## 2019-03-14 NOTE — CHART NOTE - NSCHARTNOTEFT_GEN_A_CORE
R2 OBGYN POST-OP CHECK    STEVEN [id number]      S: Patient seen and evaluated at bedside.  She is awake, alert and resting comfortably in bed.   Patient reports pain controlled with analgesia. Pt reports nausea at times, has received IV zofran. denies, SOB, CP, palpitations, fever/chills.   has not attempted po intake.  Not OOB yet.    O:   T(C): 36.4 (03-14-19 @ 19:35), Max: 36.4 (03-14-19 @ 19:35)  HR: 91 (03-14-19 @ 21:30) (81 - 97)  BP: 104/56 (03-14-19 @ 21:30) (104/56 - 117/61)  RR: 15 (03-14-19 @ 21:30) (14 - 16)  SpO2: 100% (03-14-19 @ 21:30) (98% - 100%)  Wt(kg): --  I&O's Summary    14 Mar 2019 07:01  -  14 Mar 2019 21:53  --------------------------------------------------------  IN: 125 mL / OUT: 110 mL / NET: 15 mL        Physical Exam-  Gen: Resting comfortably in bed, NAD  CV: S1S2, RRR  Lungs: CTA B/L  Abd: soft, appropriately tender, YAN drain x 2 with serosang fluid     Inc: Clean/dry/intact, abdominal binder in place, dermabond over incision   Ext: SCD's in place and functional, non-tender b/l, no edema      A/P: 48y Female s/p Total Abdominal Hysterectomy, L salpingectomy, CATHY near rectum, abdominoplasty    1. Neuro: Analgesia PRN. acetaminophen  IVPB .. 1000 milliGRAM(s) IV Intermittent once  diazepam    Tablet 5 milliGRAM(s) Oral every 6 hours PRN muscle spasm  HYDROmorphone  Injectable 0.5 milliGRAM(s) IV Push every 10 minutes PRN Moderate Pain (4 - 6)  HYDROmorphone PCA (1 mG/mL) 30 milliLiter(s) PCA Continuous PCA Continuous  HYDROmorphone PCA (1 mG/mL) Rescue Clinician Bolus 0.5 milliGRAM(s) IV Push every 15 minutes PRN for Pain Scale GREATER THAN 6  lidocaine 1% Injectable 0.2 milliLiter(s) Local Injection once  naloxone Injectable 0.1 milliGRAM(s) IV Push every 3 minutes PRN For ANY of the following changes in patient status:  A. RR LESS THAN 10 breaths per minute, B. Oxygen saturation LESS THAN 90%, C. Sedation score of 6  ondansetron Injectable 4 milliGRAM(s) IV Push every 6 hours PRN Nausea     2. CV: Hemodynamically stable.  Monitor VS. CBC in AM.   3. Pulm: Saturating well on room air.  Encourage OOB and incentive spirometer use. Continue   4. GI: Advance to regular diet. Anti-emetics PRN.    5. : Vicente to gravity.  6. Electrolytes: LR@125cc/hr.   7. DVT ppx w/ SCD's while in bed. Early ambulation, initially with assistance then as tolerated. lovenox to begin in 8 hrs.  8. Discharge from PACU when criteria met.     Alice Lucero PGY-2

## 2019-03-14 NOTE — BRIEF OPERATIVE NOTE - NSICDXBRIEFPROCEDURE_GEN_ALL_CORE_FT
PROCEDURES:  Salpingectomy, left 14-Mar-2019 17:01:56  Lita Hickman  Total abdominal hysterectomy 14-Mar-2019 17:01:45  Lita Hickman

## 2019-03-15 ENCOUNTER — TRANSCRIPTION ENCOUNTER (OUTPATIENT)
Age: 49
End: 2019-03-15

## 2019-03-15 DIAGNOSIS — Z98.890 OTHER SPECIFIED POSTPROCEDURAL STATES: ICD-10-CM

## 2019-03-15 LAB
ANION GAP SERPL CALC-SCNC: 11 MMOL/L — SIGNIFICANT CHANGE UP (ref 5–17)
BUN SERPL-MCNC: 10 MG/DL — SIGNIFICANT CHANGE UP (ref 7–23)
CALCIUM SERPL-MCNC: 7.8 MG/DL — LOW (ref 8.4–10.5)
CHLORIDE SERPL-SCNC: 102 MMOL/L — SIGNIFICANT CHANGE UP (ref 96–108)
CO2 SERPL-SCNC: 24 MMOL/L — SIGNIFICANT CHANGE UP (ref 22–31)
CREAT SERPL-MCNC: 0.76 MG/DL — SIGNIFICANT CHANGE UP (ref 0.5–1.3)
GLUCOSE SERPL-MCNC: 114 MG/DL — HIGH (ref 70–99)
HCT VFR BLD CALC: 34.5 % — SIGNIFICANT CHANGE UP (ref 34.5–45)
HGB BLD-MCNC: 11.8 G/DL — SIGNIFICANT CHANGE UP (ref 11.5–15.5)
MCHC RBC-ENTMCNC: 31.4 PG — SIGNIFICANT CHANGE UP (ref 27–34)
MCHC RBC-ENTMCNC: 34.2 GM/DL — SIGNIFICANT CHANGE UP (ref 32–36)
MCV RBC AUTO: 91.6 FL — SIGNIFICANT CHANGE UP (ref 80–100)
PLATELET # BLD AUTO: 185 K/UL — SIGNIFICANT CHANGE UP (ref 150–400)
POTASSIUM SERPL-MCNC: 4.3 MMOL/L — SIGNIFICANT CHANGE UP (ref 3.5–5.3)
POTASSIUM SERPL-SCNC: 4.3 MMOL/L — SIGNIFICANT CHANGE UP (ref 3.5–5.3)
RBC # BLD: 3.76 M/UL — LOW (ref 3.8–5.2)
RBC # FLD: 12.2 % — SIGNIFICANT CHANGE UP (ref 10.3–14.5)
SODIUM SERPL-SCNC: 137 MMOL/L — SIGNIFICANT CHANGE UP (ref 135–145)
WBC # BLD: 12.3 K/UL — HIGH (ref 3.8–10.5)
WBC # FLD AUTO: 12.3 K/UL — HIGH (ref 3.8–10.5)

## 2019-03-15 RX ORDER — OXYCODONE HYDROCHLORIDE 5 MG/1
5 TABLET ORAL
Qty: 0 | Refills: 0 | Status: DISCONTINUED | OUTPATIENT
Start: 2019-03-15 | End: 2019-03-16

## 2019-03-15 RX ORDER — ACETAMINOPHEN 500 MG
1000 TABLET ORAL ONCE
Qty: 0 | Refills: 0 | Status: COMPLETED | OUTPATIENT
Start: 2019-03-15 | End: 2019-03-15

## 2019-03-15 RX ORDER — CEFAZOLIN SODIUM 1 G
2000 VIAL (EA) INJECTION EVERY 8 HOURS
Qty: 0 | Refills: 0 | Status: DISCONTINUED | OUTPATIENT
Start: 2019-03-15 | End: 2019-03-16

## 2019-03-15 RX ORDER — ACETAMINOPHEN 500 MG
975 TABLET ORAL EVERY 6 HOURS
Qty: 0 | Refills: 0 | Status: DISCONTINUED | OUTPATIENT
Start: 2019-03-15 | End: 2019-03-16

## 2019-03-15 RX ADMIN — OXYCODONE HYDROCHLORIDE 5 MILLIGRAM(S): 5 TABLET ORAL at 20:03

## 2019-03-15 RX ADMIN — Medication 1000 MILLIGRAM(S): at 13:41

## 2019-03-15 RX ADMIN — Medication 100 MILLIGRAM(S): at 22:13

## 2019-03-15 RX ADMIN — Medication 975 MILLIGRAM(S): at 23:15

## 2019-03-15 RX ADMIN — Medication 100 MILLIGRAM(S): at 05:43

## 2019-03-15 RX ADMIN — Medication 400 MILLIGRAM(S): at 17:41

## 2019-03-15 RX ADMIN — Medication 975 MILLIGRAM(S): at 23:45

## 2019-03-15 RX ADMIN — Medication 1000 MILLIGRAM(S): at 06:53

## 2019-03-15 RX ADMIN — Medication 5 MILLIGRAM(S): at 09:31

## 2019-03-15 RX ADMIN — Medication 400 MILLIGRAM(S): at 12:10

## 2019-03-15 RX ADMIN — OXYCODONE HYDROCHLORIDE 5 MILLIGRAM(S): 5 TABLET ORAL at 12:29

## 2019-03-15 RX ADMIN — OXYCODONE HYDROCHLORIDE 5 MILLIGRAM(S): 5 TABLET ORAL at 15:21

## 2019-03-15 RX ADMIN — ENOXAPARIN SODIUM 40 MILLIGRAM(S): 100 INJECTION SUBCUTANEOUS at 03:54

## 2019-03-15 RX ADMIN — Medication 1000 MILLIGRAM(S): at 00:17

## 2019-03-15 RX ADMIN — Medication 1000 MILLIGRAM(S): at 18:30

## 2019-03-15 RX ADMIN — OXYCODONE HYDROCHLORIDE 5 MILLIGRAM(S): 5 TABLET ORAL at 23:45

## 2019-03-15 RX ADMIN — Medication 5 MILLIGRAM(S): at 00:22

## 2019-03-15 RX ADMIN — Medication 400 MILLIGRAM(S): at 06:29

## 2019-03-15 RX ADMIN — OXYCODONE HYDROCHLORIDE 5 MILLIGRAM(S): 5 TABLET ORAL at 10:18

## 2019-03-15 RX ADMIN — OXYCODONE HYDROCHLORIDE 5 MILLIGRAM(S): 5 TABLET ORAL at 16:00

## 2019-03-15 RX ADMIN — Medication 400 MILLIGRAM(S): at 00:02

## 2019-03-15 RX ADMIN — OXYCODONE HYDROCHLORIDE 5 MILLIGRAM(S): 5 TABLET ORAL at 23:15

## 2019-03-15 RX ADMIN — HYDROMORPHONE HYDROCHLORIDE 0.5 MILLIGRAM(S): 2 INJECTION INTRAMUSCULAR; INTRAVENOUS; SUBCUTANEOUS at 02:46

## 2019-03-15 RX ADMIN — OXYCODONE HYDROCHLORIDE 5 MILLIGRAM(S): 5 TABLET ORAL at 20:50

## 2019-03-15 RX ADMIN — Medication 100 MILLIGRAM(S): at 23:14

## 2019-03-15 RX ADMIN — Medication 100 MILLIGRAM(S): at 13:45

## 2019-03-15 NOTE — PROGRESS NOTE ADULT - SUBJECTIVE AND OBJECTIVE BOX
Plastic Surgery Progress Note (pg [facility]: [id number], NS: [phone])    SUBJECTIVE:  Doing ok, c/o some nausea relieved by meds; Valium has been most beneficial; not eating very much; not using PCA.      OBJECTIVE:     ** VITAL SIGNS / I&O's **    Vital Signs Last 24 Hrs  T(C): 36.7 (15 Mar 2019 04:02), Max: 36.7 (14 Mar 2019 11:49)  T(F): 98 (15 Mar 2019 04:02), Max: 98.1 (14 Mar 2019 11:49)  HR: 74 (15 Mar 2019 04:02) (66 - 97)  BP: 110/66 (15 Mar 2019 04:02) (103/59 - 128/86)  BP(mean): 80 (15 Mar 2019 04:02) (74 - 84)  RR: 18 (15 Mar 2019 04:02) (14 - 18)  SpO2: 98% (15 Mar 2019 04:02) (98% - 100%)      14 Mar 2019 07:01  -  15 Mar 2019 07:00  --------------------------------------------------------  IN:    IV PiggyBack: 350 mL    lactated ringers.: 969 mL  Total IN: 1319 mL    OUT:    Bulb: 22.5 mL    Bulb: 25 mL    Indwelling Catheter - Urethral: 720 mL  Total OUT: 767.5 mL    Total NET: 551.5 mL          ** PHYSICAL EXAM **    -- CONSTITUTIONAL: AOx3. NAD.   -- ABDOMEN: Soft, no collections, JPs ss, incision c/d/i, umbilicus viable      **MEDS**  acetaminophen   Tablet .. 975 milliGRAM(s) Oral every 6 hours  acetaminophen  IVPB .. 1000 milliGRAM(s) IV Intermittent once  ceFAZolin   IVPB 2000 milliGRAM(s) IV Intermittent every 8 hours  diazepam    Tablet 5 milliGRAM(s) Oral every 6 hours PRN  docusate sodium 100 milliGRAM(s) Oral three times a day PRN  enoxaparin Injectable 40 milliGRAM(s) SubCutaneous every 24 hours  lactated ringers. 1000 milliLiter(s) IV Continuous <Continuous>  lidocaine 1% Injectable 0.2 milliLiter(s) Local Injection once  oxyCODONE    IR 5 milliGRAM(s) Oral every 3 hours PRN  senna 2 Tablet(s) Oral at bedtime PRN      ** LABS **                          13.0   14.9  )-----------( 198      ( 14 Mar 2019 22:25 )             37.5     14 Mar 2019 22:25    141    |  104    |  12     ----------------------------<  218    4.2     |  21     |  0.81     Ca    7.9        14 Mar 2019 22:25        CAPILLARY BLOOD GLUCOSE      POCT Blood Glucose.: 89 mg/dL (14 Mar 2019 11:55)

## 2019-03-15 NOTE — DISCHARGE NOTE PROVIDER - NSDCFUADDINST_GEN_ALL_CORE_FT
PAIN: Take oxycodone as need for pain. Complete 7 day Keflex course   WOUND: Please keep incisions clean and dry. Please do not scrub or rub incisions. Please to do not apply lotion or powder on incisions. Keep abdominal binder in place.   BATH: Please do not submerge wound under water. You may shower or use sponge bath.  ACTIVITY: No heavy lifting or straining until your follow up appointment. Otherwise, you may return to your usual level of physical activity.  NOTIFY YOUR SURGEON IF: You have any bleeding that does not stop, any pus draining from your wound(s), any fever (over 100.4 F) or chills

## 2019-03-15 NOTE — DISCHARGE NOTE PROVIDER - CARE PROVIDER_API CALL
Travis Mcdaniel)  Obstetrics and Gynecology  [address], [address]  [place] [address]  Phone: [phone]  Fax: [phone]  Follow Up Time:     Zackary Craig)  Plastic Surgery  [address], [address]  [place] [address]  Phone: [phone]  Fax: [phone]  Follow Up Time: Travis Mcdaniel)  Obstetrics and Gynecology  [address], [address]  [place] [address]  Phone: [phone]  Fax: [phone]  Follow Up Time:     Zackary Craig)  Plastic Surgery  [address], [address]  [place] [address]  Phone: [phone]  Fax: [phone]  Follow Up Time:

## 2019-03-15 NOTE — PROGRESS NOTE ADULT - ATTENDING COMMENTS
Patient seen and examined at bedside, agree with above plan. States PCA is making her nauseous, will give dose of IV tylenol and transition to PO pain medication, no NSAIDS per plastics. Vicente to be discontinued today. Continue Lovenox 40mg daily. appreciate plastics care. Patient seen and examined at bedside, agree with above plan. abdomen appropriately tender, non distended. States PCA is making her nauseous, will give dose of IV tylenol and transition to PO pain medication, no NSAIDS per plastics. Vicente to be discontinued today. Continue Lovenox 40mg daily. appreciate plastics care. Patient seen and examined at bedside, agree with above plan. abdomen appropriately tender, non distended. States PCA is making her nauseous, will give dose of IV tylenol and transition to PO pain medication, no NSAIDS per plastics. Vicente to be discontinued today. Continue Lovenox 40mg daily. appreciate plastics care.    GYN Attg:  Pt seen and examined. I agree with above assessment and plan.  ANTWON Mcdaniel

## 2019-03-15 NOTE — PROGRESS NOTE ADULT - SUBJECTIVE AND OBJECTIVE BOX
ACS Team (General Surgery) Daily Progress Note    SUBJECTIVE:  Pt seen and examined this AM. No acute events overnight. Reports good pain control. Has not yet ambulated since surgery. Tolerating liquids has not tried to eat solid food yet. Denied flatus or BM.     OBJECTIVE:  Vital Signs Last 24 Hrs  T(C): 36.9 (15 Mar 2019 17:26), Max: 36.9 (15 Mar 2019 09:16)  T(F): 98.4 (15 Mar 2019 17:26), Max: 98.4 (15 Mar 2019 09:16)  HR: 66 (15 Mar 2019 17:26) (66 - 97)  BP: 103/68 (15 Mar 2019 17:26) (96/62 - 117/61)  BP(mean): 80 (15 Mar 2019 04:02) (74 - 84)  RR: 18 (15 Mar 2019 17:26) (14 - 18)  SpO2: 98% (15 Mar 2019 17:26) (97% - 100%)    I&O's Detail    14 Mar 2019 07:01  -  15 Mar 2019 07:00  --------------------------------------------------------  IN:    IV PiggyBack: 350 mL    lactated ringers.: 969 mL  Total IN: 1319 mL    OUT:    Bulb: 22.5 mL    Bulb: 25 mL    Indwelling Catheter - Urethral: 720 mL  Total OUT: 767.5 mL    Total NET: 551.5 mL      15 Mar 2019 07:01  -  15 Mar 2019 18:31  --------------------------------------------------------  IN:    lactated ringers.: 900 mL  Total IN: 900 mL    OUT:    Bulb: 25 mL    Bulb: 45 mL    Indwelling Catheter - Urethral: 750 mL    Voided: 600 mL  Total OUT: 1420 mL    Total NET: -520 mL        Exam:  GEN: A&Ox3, NAD  HEENT: atraumatic, normocephalic  CV: RRR  RESP: no increased work of breathing, no use of accessory muscles  GI/ABD: soft, appropriately tender, nondistended. Ab binder in place. YAN x 2 one in RLQ and LLQ respectively with serosanguinous output.   : normal appearing, mcnally in place  EXTREMITIES: warm, pink, well-perfused                        11.8   12.3  )-----------( 185      ( 15 Mar 2019 07:14 )             34.5       03-15    137  |  102  |  10  ----------------------------<  114<H>  4.3   |  24  |  0.76    Ca    7.8<L>      15 Mar 2019 07:14

## 2019-03-15 NOTE — DISCHARGE NOTE PROVIDER - CARE PROVIDERS DIRECT ADDRESSES
[email or web address]

## 2019-03-15 NOTE — PROGRESS NOTE ADULT - SUBJECTIVE AND OBJECTIVE BOX
HD#2             POD#1     Patient seen and examined at bedside, no acute overnight events. Patient reports moderate pain on abdomen. Patient is passing flatus and tolerating liquids. Denies CP, SOB, N/V, fevers, and chills.    Vital Signs Last 24 Hours  T(C): 36.7 (03-15-19 @ 04:02), Max: 36.7 (03-14-19 @ 11:49)  HR: 74 (03-15-19 @ 04:02) (66 - 97)  BP: 110/66 (03-15-19 @ 04:02) (103/59 - 128/86)  RR: 18 (03-15-19 @ 04:02) (14 - 18)  SpO2: 98% (03-15-19 @ 04:02) (98% - 100%)    I&O's Summary    14 Mar 2019 07:01  -  15 Mar 2019 06:49  --------------------------------------------------------  IN: 1319 mL / OUT: 767.5 mL / NET: 551.5 mL      Physical Exam:  General: NAD  CV: NR, RRR  Lungs: CTAB  Abdomen: Soft, non-tender, non-distended, +BS, YAN drains with 10cc output   Ext: No pain or swelling      Labs:             13.0   14.9<H> )-----------( 198      ( 03-14 @ 22:25 )             37.5         MEDICATIONS  (STANDING):  ceFAZolin   IVPB 2000 milliGRAM(s) IV Intermittent every 8 hours  enoxaparin Injectable 40 milliGRAM(s) SubCutaneous every 24 hours  HYDROmorphone PCA (1 mG/mL) 30 milliLiter(s) PCA Continuous PCA Continuous  lactated ringers. 1000 milliLiter(s) (125 mL/Hr) IV Continuous <Continuous>  lidocaine 1% Injectable 0.2 milliLiter(s) Local Injection once    MEDICATIONS  (PRN):  diazepam    Tablet 5 milliGRAM(s) Oral every 6 hours PRN muscle spasm  docusate sodium 100 milliGRAM(s) Oral three times a day PRN Stool Softening  HYDROmorphone PCA (1 mG/mL) Rescue Clinician Bolus 0.5 milliGRAM(s) IV Push every 15 minutes PRN for Pain Scale GREATER THAN 6  naloxone Injectable 0.1 milliGRAM(s) IV Push every 3 minutes PRN For ANY of the following changes in patient status:  A. RR LESS THAN 10 breaths per minute, B. Oxygen saturation LESS THAN 90%, C. Sedation score of 6  ondansetron Injectable 4 milliGRAM(s) IV Push every 6 hours PRN Nausea  senna 2 Tablet(s) Oral at bedtime PRN Constipation HD#2             POD#1     Patient seen and examined at bedside, no acute overnight events. Patient reports moderate pain on abdomen. Patient is passing flatus and tolerating liquids. Denies CP, SOB, N/V, fevers, and chills.    Vital Signs Last 24 Hours  T(C): 36.7 (03-15-19 @ 04:02), Max: 36.7 (03-14-19 @ 11:49)  HR: 74 (03-15-19 @ 04:02) (66 - 97)  BP: 110/66 (03-15-19 @ 04:02) (103/59 - 128/86)  RR: 18 (03-15-19 @ 04:02) (14 - 18)  SpO2: 98% (03-15-19 @ 04:02) (98% - 100%)    I&O's Summary    14 Mar 2019 07:01  -  15 Mar 2019 06:49  --------------------------------------------------------  IN: 1319 mL / OUT: 767.5 mL / NET: 551.5 mL      Physical Exam:  General: NAD  CV: NR, RRR  Lungs: CTAB  Abdomen: Soft, non-tender, non-distended, +BS, YAN drains with 10cc output serosanguinous   Ext: No pain or swelling      Labs:             13.0   14.9<H> )-----------( 198      ( 03-14 @ 22:25 )             37.5         MEDICATIONS  (STANDING):  ceFAZolin   IVPB 2000 milliGRAM(s) IV Intermittent every 8 hours  enoxaparin Injectable 40 milliGRAM(s) SubCutaneous every 24 hours  HYDROmorphone PCA (1 mG/mL) 30 milliLiter(s) PCA Continuous PCA Continuous  lactated ringers. 1000 milliLiter(s) (125 mL/Hr) IV Continuous <Continuous>  lidocaine 1% Injectable 0.2 milliLiter(s) Local Injection once    MEDICATIONS  (PRN):  diazepam    Tablet 5 milliGRAM(s) Oral every 6 hours PRN muscle spasm  docusate sodium 100 milliGRAM(s) Oral three times a day PRN Stool Softening  HYDROmorphone PCA (1 mG/mL) Rescue Clinician Bolus 0.5 milliGRAM(s) IV Push every 15 minutes PRN for Pain Scale GREATER THAN 6  naloxone Injectable 0.1 milliGRAM(s) IV Push every 3 minutes PRN For ANY of the following changes in patient status:  A. RR LESS THAN 10 breaths per minute, B. Oxygen saturation LESS THAN 90%, C. Sedation score of 6  ondansetron Injectable 4 milliGRAM(s) IV Push every 6 hours PRN Nausea  senna 2 Tablet(s) Oral at bedtime PRN Constipation HD#2             POD#1     Patient seen and examined at bedside, no acute overnight events. Patient reports moderate pain on abdomen. Patient is passing flatus and tolerating liquids. Mild nausea overnight, somewhat improved now. Patient with mild lightheadedness, not out of bed yet. Denies CP, SOB, fevers, and chills.    Vital Signs Last 24 Hours  T(C): 36.7 (03-15-19 @ 04:02), Max: 36.7 (03-14-19 @ 11:49)  HR: 74 (03-15-19 @ 04:02) (66 - 97)  BP: 110/66 (03-15-19 @ 04:02) (103/59 - 128/86)  RR: 18 (03-15-19 @ 04:02) (14 - 18)  SpO2: 98% (03-15-19 @ 04:02) (98% - 100%)    I&O's Summary    14 Mar 2019 07:01  -  15 Mar 2019 06:49  --------------------------------------------------------  IN: 1319 mL / OUT: 767.5 mL / NET: 551.5 mL      Physical Exam:  General: NAD  CV: NR, RRR  Lungs: CTAB  Abdomen: Soft, non-tender, non-distended, +BS, YAN drains with 10cc output serosanguinous   Ext: No pain or swelling      Labs:             13.0   14.9<H> )-----------( 198      ( 03-14 @ 22:25 )             37.5         MEDICATIONS  (STANDING):  ceFAZolin   IVPB 2000 milliGRAM(s) IV Intermittent every 8 hours  enoxaparin Injectable 40 milliGRAM(s) SubCutaneous every 24 hours  HYDROmorphone PCA (1 mG/mL) 30 milliLiter(s) PCA Continuous PCA Continuous  lactated ringers. 1000 milliLiter(s) (125 mL/Hr) IV Continuous <Continuous>  lidocaine 1% Injectable 0.2 milliLiter(s) Local Injection once    MEDICATIONS  (PRN):  diazepam    Tablet 5 milliGRAM(s) Oral every 6 hours PRN muscle spasm  docusate sodium 100 milliGRAM(s) Oral three times a day PRN Stool Softening  HYDROmorphone PCA (1 mG/mL) Rescue Clinician Bolus 0.5 milliGRAM(s) IV Push every 15 minutes PRN for Pain Scale GREATER THAN 6  naloxone Injectable 0.1 milliGRAM(s) IV Push every 3 minutes PRN For ANY of the following changes in patient status:  A. RR LESS THAN 10 breaths per minute, B. Oxygen saturation LESS THAN 90%, C. Sedation score of 6  ondansetron Injectable 4 milliGRAM(s) IV Push every 6 hours PRN Nausea  senna 2 Tablet(s) Oral at bedtime PRN Constipation

## 2019-03-15 NOTE — PROGRESS NOTE ADULT - ASSESSMENT
49 yo POD#2 s/p KEELY, L salpingectomy, CATHY near cecum, abdominoplasty, recoverign without issue    - Monitor for return of bowel function  - Adv diet as tolerated  - will follow  - Plan discussed with attending, Dr Kelli Martin PGY-2  ACS [id number]

## 2019-03-15 NOTE — PROGRESS NOTE ADULT - ASSESSMENT
47 yo POD#2 s/p KEELY, L salpingectomy, CATHY near cecum, abdominoplasty. Patient doing well postoperatively.

## 2019-03-15 NOTE — DISCHARGE NOTE PROVIDER - NSDCCPTREATMENT_GEN_ALL_CORE_FT
PRINCIPAL PROCEDURE  Procedure: Total abdominal hysterectomy  Findings and Treatment: Regular diet as tolerated, regular activity as tolerated, no heavy lifting for first two weeks.  Nothing per vagina: no intercourse, tampons or douching.  Call your provider if you experience fevers, chills, worsening abdominal pain, inability to urinate or worsening vaginal bleeding.  Follow up with your provider in 2 weeks.      SECONDARY PROCEDURE  Procedure: Salpingectomy, left  Findings and Treatment: PRINCIPAL PROCEDURE  Procedure: Total abdominal hysterectomy  Findings and Treatment: Regular diet as tolerated, regular activity as tolerated, no heavy lifting for first two weeks.  Nothing per vagina: no intercourse, tampons or douching.  Call your provider if you experience fevers, chills, worsening abdominal pain, inability to urinate or worsening vaginal bleeding.  Follow up with your provider in 2 weeks.  Resume normal diet. Avoid straining, exercise, or heavy lifting.  Take medications as instructed by prescriptions.  You will be discharged with YAN drains. You will need to empty them and record outputs accurately. This will be taught to you by the nursing staff. Please do not remove the YAN drains. They will be removed in the office. Please bring to the office accurate records of output.   24-48 hrs after surgery, it's OK to shower/rinse with warm/soapy water, pat dry (NO scrubbing or rubbing).  Keep abdominal binder in place.   Follow-up with primary care doctor as well.   Call 911 and return to the ED for chest pain, shortness of breath, significant increase in pain, or significant change in color of surgical sites.      SECONDARY PROCEDURE  Procedure: Salpingectomy, left  Findings and Treatment:

## 2019-03-15 NOTE — PROGRESS NOTE ADULT - NSICDXPROBLEM_GEN_ALL_CORE_FT
PROBLEM DIAGNOSES  Problem: Postoperative state  Assessment and Plan: CVS: hemodynamically stable, f/u AM CBC, BMP   Pulm: no acute issues, icentive sirometry use encouraged  GI: regular diet   : adequate UOP, d/c Vicente after AM CBC    Heme: venodynes for DVT Prophylaxis   Neuro: Transition to PO pain meds, avoid NSAIDs per plastics   Dispo: continue current management PROBLEM DIAGNOSES  Problem: Postoperative state  Assessment and Plan: CVS: hemodynamically stable, f/u AM CBC, BMP   Pulm: no acute issues, incentive spirometry use encouraged  GI: regular diet   : adequate UOP, d/c Vicente after AM CBC    Heme: venodynes for DVT Prophylaxis   Neuro: Transition to PO pain meds, avoid NSAIDs per plastics   Dispo: continue current management

## 2019-03-15 NOTE — PROGRESS NOTE ADULT - ASSESSMENT
A/P: 48F s/p abdominoplasty, KEELY L salpingectomy (POD1)    - Rec d/c PCA  - YAN care/education  - C/w binder  - Dressings to be changed by Dr. Rafa Vicente per GYN  - Recommend ambulating today  - SCDs  - C/w abx, will be sent home w/ them  - PRS to follow    PRS: [phone]

## 2019-03-15 NOTE — DISCHARGE NOTE PROVIDER - HOSPITAL COURSE
49 y/o s/p total abdominal hysterectomy, left salpingectomy, lysis of adhesions, abdominoplasty. . Please see operative note for details.  The patient was transferred to the floor post-op in stable condition with a regular diet and Mcnally in place. POD#1 The patient was transitioned to PO pain medication, mcnally was removed and the patient voided.  Hct: _____->____On the day of discharge the patient is afebrile and hemodynamically stable. She is ambulating without assistance, voiding spontaneously, and tolerating regular diet. He pain is well controlled on oral medication. She is cleared for discharge with instructions for appropriate follow up. 47 y/o s/p total abdominal hysterectomy, left salpingectomy, lysis of adhesions, abdominoplasty. . Please see operative note for details.  The patient was transferred to the floor post-op in stable condition with a regular diet and Mcnally in place. POD#1 The patient was transitioned to PO pain medication, mcnally was removed and the patient voided.  Hct: 34.5->32.0. On the day of discharge the patient is afebrile and hemodynamically stable. She is ambulating without assistance, voiding spontaneously, and tolerating regular diet. He pain is well controlled on oral medication. She is cleared for discharge with instructions for appropriate follow up.

## 2019-03-15 NOTE — PROGRESS NOTE ADULT - SUBJECTIVE AND OBJECTIVE BOX
Day 1 of Anesthesia Pain Management Service    SUBJECTIVE: Patient is doing well with IV PCA    Pain Scale Score:	[X] Refer to charted pain scores    THERAPY:    [ ] IV PCA Morphine		[ ] 5 mg/mL	[ ] 1 mg/mL  [X] IV PCA Hydromorphone	[ ] 5 mg/mL	[X] 1 mg/mL  [ ] IV PCA Fentanyl		[ ] 50 micrograms/mL    Demand dose: 0.1 mg     Lockout: 6 minutes   Continuous Rate: 0 mg/hr  4 Hour Limit: 4 mg    MEDICATIONS  (STANDING):  acetaminophen   Tablet .. 975 milliGRAM(s) Oral every 6 hours  acetaminophen  IVPB .. 1000 milliGRAM(s) IV Intermittent once  ceFAZolin   IVPB 2000 milliGRAM(s) IV Intermittent every 8 hours  enoxaparin Injectable 40 milliGRAM(s) SubCutaneous every 24 hours  lactated ringers. 1000 milliLiter(s) (125 mL/Hr) IV Continuous <Continuous>  lidocaine 1% Injectable 0.2 milliLiter(s) Local Injection once    MEDICATIONS  (PRN):  diazepam    Tablet 5 milliGRAM(s) Oral every 6 hours PRN muscle spasm  docusate sodium 100 milliGRAM(s) Oral three times a day PRN Stool Softening  oxyCODONE    IR 5 milliGRAM(s) Oral every 3 hours PRN Moderate Pain (4 - 6)  senna 2 Tablet(s) Oral at bedtime PRN Constipation      OBJECTIVE:    Sedation Score:	[ X] Alert	[ ] Drowsy 	[ ] Arousable	[ ] Asleep	[ ] Unresponsive    Side Effects:	[ ] None	[X ] Nausea: antiemetics PRN	[ ] Vomiting	[ ] Pruritus  		[ ] Other:    Vital Signs Last 24 Hrs  T(C): 36.7 (15 Mar 2019 04:02), Max: 36.7 (14 Mar 2019 11:49)  T(F): 98 (15 Mar 2019 04:02), Max: 98.1 (14 Mar 2019 11:49)  HR: 74 (15 Mar 2019 04:02) (66 - 97)  BP: 110/66 (15 Mar 2019 04:02) (103/59 - 128/86)  BP(mean): 80 (15 Mar 2019 04:02) (74 - 84)  RR: 18 (15 Mar 2019 04:02) (14 - 18)  SpO2: 98% (15 Mar 2019 04:02) (98% - 100%)    ASSESSMENT/ PLAN    Therapy to  be:               [  ] Continued   [X ] Discontinued   [X ] Changed to PRN Analgesics    Documentation and Verification of current medications:   [X] Done	[ ] Not done, not eligible    Comments: PCA D\C'd by primary service

## 2019-03-16 ENCOUNTER — TRANSCRIPTION ENCOUNTER (OUTPATIENT)
Age: 49
End: 2019-03-16

## 2019-03-16 VITALS
RESPIRATION RATE: 18 BRPM | DIASTOLIC BLOOD PRESSURE: 79 MMHG | HEART RATE: 73 BPM | OXYGEN SATURATION: 96 % | TEMPERATURE: 99 F | SYSTOLIC BLOOD PRESSURE: 123 MMHG

## 2019-03-16 LAB
HCT VFR BLD CALC: 32 % — LOW (ref 34.5–45)
HGB BLD-MCNC: 10.9 G/DL — LOW (ref 11.5–15.5)
MCHC RBC-ENTMCNC: 31.5 PG — SIGNIFICANT CHANGE UP (ref 27–34)
MCHC RBC-ENTMCNC: 34.2 GM/DL — SIGNIFICANT CHANGE UP (ref 32–36)
MCV RBC AUTO: 92.3 FL — SIGNIFICANT CHANGE UP (ref 80–100)
PLATELET # BLD AUTO: 159 K/UL — SIGNIFICANT CHANGE UP (ref 150–400)
RBC # BLD: 3.46 M/UL — LOW (ref 3.8–5.2)
RBC # FLD: 12.3 % — SIGNIFICANT CHANGE UP (ref 10.3–14.5)
WBC # BLD: 8 K/UL — SIGNIFICANT CHANGE UP (ref 3.8–10.5)
WBC # FLD AUTO: 8 K/UL — SIGNIFICANT CHANGE UP (ref 3.8–10.5)

## 2019-03-16 PROCEDURE — 81025 URINE PREGNANCY TEST: CPT

## 2019-03-16 PROCEDURE — 82962 GLUCOSE BLOOD TEST: CPT

## 2019-03-16 PROCEDURE — 74018 RADEX ABDOMEN 1 VIEW: CPT

## 2019-03-16 PROCEDURE — C1889: CPT

## 2019-03-16 PROCEDURE — 80048 BASIC METABOLIC PNL TOTAL CA: CPT

## 2019-03-16 PROCEDURE — 88307 TISSUE EXAM BY PATHOLOGIST: CPT

## 2019-03-16 PROCEDURE — 88302 TISSUE EXAM BY PATHOLOGIST: CPT

## 2019-03-16 PROCEDURE — 85027 COMPLETE CBC AUTOMATED: CPT

## 2019-03-16 RX ADMIN — Medication 100 MILLIGRAM(S): at 05:19

## 2019-03-16 RX ADMIN — OXYCODONE HYDROCHLORIDE 5 MILLIGRAM(S): 5 TABLET ORAL at 05:18

## 2019-03-16 RX ADMIN — ENOXAPARIN SODIUM 40 MILLIGRAM(S): 100 INJECTION SUBCUTANEOUS at 03:54

## 2019-03-16 RX ADMIN — Medication 975 MILLIGRAM(S): at 12:00

## 2019-03-16 RX ADMIN — OXYCODONE HYDROCHLORIDE 5 MILLIGRAM(S): 5 TABLET ORAL at 02:12

## 2019-03-16 RX ADMIN — Medication 975 MILLIGRAM(S): at 05:18

## 2019-03-16 RX ADMIN — Medication 5 MILLIGRAM(S): at 03:54

## 2019-03-16 RX ADMIN — SODIUM CHLORIDE 125 MILLILITER(S): 9 INJECTION, SOLUTION INTRAVENOUS at 05:19

## 2019-03-16 RX ADMIN — OXYCODONE HYDROCHLORIDE 5 MILLIGRAM(S): 5 TABLET ORAL at 10:00

## 2019-03-16 RX ADMIN — OXYCODONE HYDROCHLORIDE 5 MILLIGRAM(S): 5 TABLET ORAL at 03:00

## 2019-03-16 RX ADMIN — OXYCODONE HYDROCHLORIDE 5 MILLIGRAM(S): 5 TABLET ORAL at 13:30

## 2019-03-16 RX ADMIN — OXYCODONE HYDROCHLORIDE 5 MILLIGRAM(S): 5 TABLET ORAL at 06:17

## 2019-03-16 RX ADMIN — OXYCODONE HYDROCHLORIDE 5 MILLIGRAM(S): 5 TABLET ORAL at 14:10

## 2019-03-16 RX ADMIN — Medication 975 MILLIGRAM(S): at 06:17

## 2019-03-16 RX ADMIN — Medication 975 MILLIGRAM(S): at 11:16

## 2019-03-16 RX ADMIN — OXYCODONE HYDROCHLORIDE 5 MILLIGRAM(S): 5 TABLET ORAL at 09:35

## 2019-03-16 NOTE — PROGRESS NOTE ADULT - NSICDXPROBLEM_GEN_ALL_CORE_FT
PROBLEM DIAGNOSES  Problem: Postoperative state  Assessment and Plan: CVS: hemodynamically stable, f/u AM CBC, BMP   Pulm: no acute issues, incentive spirometry use encouraged  GI: regular diet   : voiding spontaneously   Heme: continue with lovenox and venodynes for DVT Prophylaxis   Neuro: continue with PO pain meds, avoid NSAIDs per plastics   ID: continue with ancef per plastics  Dispo: continue routine inpatient care  Liam Martinez MD PGY2

## 2019-03-16 NOTE — PROGRESS NOTE ADULT - SUBJECTIVE AND OBJECTIVE BOX
Surgery Progress Note    S: Patient seen and examined. No acute events overnight. Pain well controlled. Tolerating diet.     O:  Vital Signs Last 24 Hrs  T(C): 37.2 (16 Mar 2019 05:20), Max: 37.3 (16 Mar 2019 01:03)  T(F): 99 (16 Mar 2019 05:20), Max: 99.1 (16 Mar 2019 01:03)  HR: 72 (16 Mar 2019 05:20) (65 - 78)  BP: 119/73 (16 Mar 2019 05:20) (96/62 - 119/73)  BP(mean): --  RR: 18 (16 Mar 2019 05:20) (16 - 18)  SpO2: 97% (16 Mar 2019 05:20) (97% - 99%)    I&O's Detail    15 Mar 2019 07:01  -  16 Mar 2019 07:00  --------------------------------------------------------  IN:    IV PiggyBack: 50 mL    lactated ringers.: 1837 mL  Total IN: 1887 mL    OUT:    Bulb: 75 mL    Bulb: 75 mL    Indwelling Catheter - Urethral: 750 mL    Voided: 1900 mL  Total OUT: 2800 mL    Total NET: -913 mL          MEDICATIONS  (STANDING):  acetaminophen   Tablet .. 975 milliGRAM(s) Oral every 6 hours  ceFAZolin   IVPB 2000 milliGRAM(s) IV Intermittent every 8 hours  enoxaparin Injectable 40 milliGRAM(s) SubCutaneous every 24 hours  lactated ringers. 1000 milliLiter(s) (125 mL/Hr) IV Continuous <Continuous>  lidocaine 1% Injectable 0.2 milliLiter(s) Local Injection once    MEDICATIONS  (PRN):  diazepam    Tablet 5 milliGRAM(s) Oral every 6 hours PRN muscle spasm  docusate sodium 100 milliGRAM(s) Oral three times a day PRN Stool Softening  oxyCODONE    IR 5 milliGRAM(s) Oral every 3 hours PRN Moderate Pain (4 - 6)  senna 2 Tablet(s) Oral at bedtime PRN Constipation                            11.8   12.3  )-----------( 185      ( 15 Mar 2019 07:14 )             34.5       03-15    137  |  102  |  10  ----------------------------<  114<H>  4.3   |  24  |  0.76    Ca    7.8<L>      15 Mar 2019 07:14      ** PHYSICAL EXAM **    -- CONSTITUTIONAL: AOx3. NAD.   -- ABDOMEN: Soft, no collections, JPs ss, incision c/d/i, umbilicus viable

## 2019-03-16 NOTE — PROGRESS NOTE ADULT - SUBJECTIVE AND OBJECTIVE BOX
INTERVAL HPI/OVERNIGHT EVENTS: Pt seen and examined at bedside.  Pt complains of minimal abdominal pain, thought she states its well controlled with her pain medications.  Ambulating, passing flatus, tolerating regular diet, pain controlled with analgesia, urinating spontaneously.  She denies nausea/vomiting/fever/chills/chest pain/SOB/dizziness.    MEDICATIONS  (STANDING):  acetaminophen   Tablet .. 975 milliGRAM(s) Oral every 6 hours  ceFAZolin   IVPB 2000 milliGRAM(s) IV Intermittent every 8 hours  enoxaparin Injectable 40 milliGRAM(s) SubCutaneous every 24 hours  lactated ringers. 1000 milliLiter(s) (125 mL/Hr) IV Continuous <Continuous>  lidocaine 1% Injectable 0.2 milliLiter(s) Local Injection once    MEDICATIONS  (PRN):  diazepam    Tablet 5 milliGRAM(s) Oral every 6 hours PRN muscle spasm  docusate sodium 100 milliGRAM(s) Oral three times a day PRN Stool Softening  oxyCODONE    IR 5 milliGRAM(s) Oral every 3 hours PRN Moderate Pain (4 - 6)  senna 2 Tablet(s) Oral at bedtime PRN Constipation      12 point ROS negative except as outlined above    Vital Signs Last 24 Hrs  T(C): 37.2 (16 Mar 2019 05:20), Max: 37.3 (16 Mar 2019 01:03)  T(F): 99 (16 Mar 2019 05:20), Max: 99.1 (16 Mar 2019 01:03)  HR: 72 (16 Mar 2019 05:20) (65 - 78)  BP: 119/73 (16 Mar 2019 05:20) (96/62 - 119/73)  BP(mean): --  RR: 18 (16 Mar 2019 05:20) (16 - 18)  SpO2: 97% (16 Mar 2019 05:20) (97% - 99%)    I&O's Summary    15 Mar 2019 07:01  -  16 Mar 2019 07:00  --------------------------------------------------------  IN: 1887 mL / OUT: 2800 mL / NET: -913 mL          PHYSICAL EXAM:    Physical Exam:  General: NAD  CV: NR, RRR  Lungs: CTAB  Abdomen: Soft, appropriately tender, non-distended, +BS, RLQ and LLQ YAN drains with serosanguinous output  Incision: lower horizontal abdominal incision clean, dry and intact, healing appropriately  Ext: No pain or swelling    LABS:                          11.8   12.3  )-----------( 185      ( 15 Mar 2019 07:14 )             34.5   baso x      eos x      imm gran x      lymph x      mono x      poly x                            13.0   14.9  )-----------( 198      ( 14 Mar 2019 22:25 )             37.5   baso 0.1    eos 0.2    imm gran x      lymph 4.9    mono 5.5    poly 89.3

## 2019-03-16 NOTE — PROGRESS NOTE ADULT - ATTENDING COMMENTS
I have personally seen and examined the patient and agree with the above stated assessment and plan.    Ms Jaramillo is POD 2 from KEELY/LS/CATHY and abdominoplasty, doing well, passing flatus/ambulating/tolerating PO/pain with PO meds.  - stable for DC home today  - Plastics in agreement    CHRISTIANO Zapata Fellow I have personally seen and examined the patient and agree with the above stated assessment and plan.    Ms Jaramillo is POD 2 from KEELY/LS/CATHY and abdominoplasty, doing well, passing flatus/ambulating/tolerating PO/pain with PO meds.  - stable for DC home today  - Plastics in agreement    CHRISTIANO Zapata Fellow    GYN Attg:  Pt seen and examined. I agree with above assessment and plan. Discharge planning. Pt to f/u with me in my office and with Dr. Craig fro routine postop mgmt. All questions answered.  CANDIDA Mcdaniel MD

## 2019-03-16 NOTE — PROGRESS NOTE ADULT - ASSESSMENT
A/P: 48F s/p abdominoplasty, KEELY L salpingectomy (POD1)    - YAN care/education  - C/w binder  - Dressings to be changed by Dr. Rafa Vicente per GYN  - Recommend ambulating today  - SCDs  - C/w abx, will be sent home w/ them  - care per primary team     PRS: [phone]

## 2019-03-16 NOTE — DISCHARGE NOTE NURSING/CASE MANAGEMENT/SOCIAL WORK - NSDCPNINST_GEN_ALL_CORE
Please call the doctor for any heavy vaginal bleeding, fever, nausea or vomiting, inability to urinate, any drainage or bleeding from incision or scope sites

## 2019-03-16 NOTE — DISCHARGE NOTE NURSING/CASE MANAGEMENT/SOCIAL WORK - NSDCDPATPORTLINK_GEN_ALL_CORE
You can access the [facility] Patient Portal, offered by [facility], by registering with the following website: [email or web address]

## 2019-03-19 LAB — SURGICAL PATHOLOGY STUDY: SIGNIFICANT CHANGE UP

## 2019-03-29 ENCOUNTER — APPOINTMENT (OUTPATIENT)
Dept: OBGYN | Facility: CLINIC | Age: 49
End: 2019-03-29
Payer: COMMERCIAL

## 2019-03-29 PROBLEM — N80.0 ENDOMETRIOSIS OF UTERUS: Chronic | Status: ACTIVE | Noted: 2019-03-07

## 2019-03-29 PROCEDURE — 99024 POSTOP FOLLOW-UP VISIT: CPT

## 2019-04-15 NOTE — PRE-ANESTHESIA EVALUATION ADULT - BMI (KG/M2)
She thought she had a appt today but it is set for 5/15    Pt came in and stated that she is feeling slight numbness . As well was wondering on issues going on with her pump . She stated that she had side pain numbing from he anal cavity .     She also was making know about her NORCO  That might need to be refilled .     Medication info is listed below     HYDROcodone-acetaminophen (NORCO)  MG per tablet     Pharmacy:  [facility] - [place], [place] - [address] - [phone]  - [phone] FX Phone:  [phone] Fax:  [phone]    Address:  [address] [place] [address]         24.1

## 2019-04-26 ENCOUNTER — APPOINTMENT (OUTPATIENT)
Dept: OBGYN | Facility: CLINIC | Age: 49
End: 2019-04-26
Payer: COMMERCIAL

## 2019-04-26 PROCEDURE — 99024 POSTOP FOLLOW-UP VISIT: CPT

## 2019-05-15 ENCOUNTER — APPOINTMENT (OUTPATIENT)
Dept: OBGYN | Facility: CLINIC | Age: 49
End: 2019-05-15

## 2019-08-30 ENCOUNTER — APPOINTMENT (OUTPATIENT)
Dept: OBGYN | Facility: CLINIC | Age: 49
End: 2019-08-30
Payer: COMMERCIAL

## 2019-08-30 PROCEDURE — 99213 OFFICE O/P EST LOW 20 MIN: CPT | Mod: 25

## 2019-08-30 PROCEDURE — 76830 TRANSVAGINAL US NON-OB: CPT

## 2019-10-01 ENCOUNTER — APPOINTMENT (OUTPATIENT)
Dept: CT IMAGING | Facility: CLINIC | Age: 49
End: 2019-10-01
Payer: COMMERCIAL

## 2019-10-01 ENCOUNTER — OUTPATIENT (OUTPATIENT)
Dept: OUTPATIENT SERVICES | Facility: HOSPITAL | Age: 49
LOS: 1 days | End: 2019-10-01
Payer: COMMERCIAL

## 2019-10-01 DIAGNOSIS — Z00.8 ENCOUNTER FOR OTHER GENERAL EXAMINATION: ICD-10-CM

## 2019-10-01 DIAGNOSIS — Z90.49 ACQUIRED ABSENCE OF OTHER SPECIFIED PARTS OF DIGESTIVE TRACT: Chronic | ICD-10-CM

## 2019-10-01 DIAGNOSIS — Z90.89 ACQUIRED ABSENCE OF OTHER ORGANS: Chronic | ICD-10-CM

## 2019-10-01 DIAGNOSIS — Z98.890 OTHER SPECIFIED POSTPROCEDURAL STATES: Chronic | ICD-10-CM

## 2019-10-01 PROCEDURE — 74178 CT ABD&PLV WO CNTR FLWD CNTR: CPT

## 2019-10-01 PROCEDURE — 74178 CT ABD&PLV WO CNTR FLWD CNTR: CPT | Mod: 26

## 2021-05-13 NOTE — H&P PST ADULT - PROBLEM/PLAN-1
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 Plan: Discussed status in detail. Rebound flare up experience, will re-start Prednisolone 1% with slow taper BID X 1 week QD X 1 week, then QD every other day X 1 week then D/C.  RTC in 4 weeks for follow up exam. RTC if any new symptoms or problems experienced. DISPLAY PLAN FREE TEXT

## 2021-09-11 NOTE — BRIEF OPERATIVE NOTE - ASSISTANT(S)
Final Anesthesia Post-op Assessment    Patient: Adrian Riley  Procedure(s) Performed: FLEXIBLE SIGMOIDOSCOPYENDO RECTAL ULTRASOUND  Anesthesia type: General    Vitals Value Taken Time   Temp 36 °C (96.8 °F) 09/11/21 1505   Pulse 84 09/11/21 1505   Resp 18 09/11/21 1505   SpO2 100 % 09/11/21 1505   /73 09/11/21 1505         Patient Location: PACU Phase 1  Post-op Vital Signs:stable  Level of Consciousness: awake and alert  Respiratory Status: spontaneous ventilation  Cardiovascular stable  Hydration: euvolemic  Pain Management: adequately controlled  Handoff: Handoff to receiving nurse was performed and questions were answered  Vomiting: none  Nausea: None  Airway Patency:patent  Post-op Assessment: no complications and patient tolerated procedure well with no complications      No complications documented.    none

## 2022-04-20 ENCOUNTER — TRANSCRIPTION ENCOUNTER (OUTPATIENT)
Age: 52
End: 2022-04-20

## 2022-06-21 NOTE — ED PROVIDER NOTE - PROGRESS NOTE DETAILS
Pt not present in lobby when called   attending Eber: evaluated by OB. No significant vaginal bleeding in ED. Cleared for dc with close outpatient follow-up.

## 2022-12-27 NOTE — BRIEF OPERATIVE NOTE - ANTIBIOTIC PROTOCOL
From: Rajani Santos  To: Deneen Doty  Sent: 12/24/2022 5:39 PM CST  Subject: Refill    May I please refill my lunesta? Thanks     Rajani  
Followed protocol

## 2023-04-28 NOTE — PRE-OP CHECKLIST - SKIN PREP
"----- Message from Baldemar Clarke sent at 4/28/2023 11:24 AM CDT -----  Consult/Advisory:          Name Of Caller: Self      Contact Preference?: [phone] (Mobile)      Provider Name: Shivam      Does patient feel the need to be seen today? No      What is the nature of the call?: Requesting confirmation if she needs an infusion during 6/15 visit           Additional Notes:  "Thank you for all that you do for our patients"      "
Left voicemail for patient, will update her once I verify with Dr. Langley if infusion is needed or not.   
done

## 2023-05-07 ENCOUNTER — NON-APPOINTMENT (OUTPATIENT)
Age: 53
End: 2023-05-07

## 2023-10-08 NOTE — ASU PATIENT PROFILE, ADULT - NS PRO LACT YNNA
no Bed in lowest position, wheels locked, appropriate side rails in place/Call bell, personal items and telephone in reach/Instruct patient to call for assistance before getting out of bed or chair/Non-slip footwear when patient is out of bed/Corry to call system/Physically safe environment - no spills, clutter or unnecessary equipment/Purposeful Proactive Rounding/Room/bathroom lighting operational, light cord in reach

## 2024-01-26 NOTE — H&P PST ADULT - PRO ANTICIPATED DISCH DISP

## 2024-08-28 ENCOUNTER — NON-APPOINTMENT (OUTPATIENT)
Age: 54
End: 2024-08-28

## 2025-07-10 ENCOUNTER — NON-APPOINTMENT (OUTPATIENT)
Age: 55
End: 2025-07-10